# Patient Record
Sex: MALE | HISPANIC OR LATINO | Employment: UNEMPLOYED | URBAN - METROPOLITAN AREA
[De-identification: names, ages, dates, MRNs, and addresses within clinical notes are randomized per-mention and may not be internally consistent; named-entity substitution may affect disease eponyms.]

---

## 2017-04-08 ENCOUNTER — HOSPITAL ENCOUNTER (EMERGENCY)
Facility: HOSPITAL | Age: 62
Discharge: HOME | End: 2017-04-08
Attending: EMERGENCY MEDICINE | Admitting: EMERGENCY MEDICINE

## 2017-04-08 ENCOUNTER — APPOINTMENT (OUTPATIENT)
Dept: CT IMAGING | Facility: HOSPITAL | Age: 62
End: 2017-04-08

## 2017-04-08 VITALS
DIASTOLIC BLOOD PRESSURE: 96 MMHG | OXYGEN SATURATION: 98 % | BODY MASS INDEX: 25.18 KG/M2 | SYSTOLIC BLOOD PRESSURE: 164 MMHG | RESPIRATION RATE: 17 BRPM | HEART RATE: 61 BPM | HEIGHT: 73 IN | TEMPERATURE: 97.4 F | WEIGHT: 190 LBS

## 2017-04-08 DIAGNOSIS — R51.9 ACUTE NONINTRACTABLE HEADACHE, UNSPECIFIED HEADACHE TYPE: Primary | ICD-10-CM

## 2017-04-08 PROCEDURE — 96374 THER/PROPH/DIAG INJ IV PUSH: CPT

## 2017-04-08 PROCEDURE — 99284 EMERGENCY DEPT VISIT MOD MDM: CPT

## 2017-04-08 PROCEDURE — 25010000002 PROMETHAZINE PER 50 MG: Performed by: EMERGENCY MEDICINE

## 2017-04-08 PROCEDURE — 70450 CT HEAD/BRAIN W/O DYE: CPT

## 2017-04-08 PROCEDURE — 25010000002 HYDROMORPHONE PER 4 MG: Performed by: EMERGENCY MEDICINE

## 2017-04-08 PROCEDURE — 96375 TX/PRO/DX INJ NEW DRUG ADDON: CPT

## 2017-04-08 RX ORDER — PROMETHAZINE HYDROCHLORIDE 25 MG/ML
25 INJECTION, SOLUTION INTRAMUSCULAR; INTRAVENOUS ONCE
Status: DISCONTINUED | OUTPATIENT
Start: 2017-04-08 | End: 2017-04-08

## 2017-04-08 RX ORDER — PROMETHAZINE HYDROCHLORIDE 25 MG/ML
25 INJECTION, SOLUTION INTRAMUSCULAR; INTRAVENOUS ONCE
Status: COMPLETED | OUTPATIENT
Start: 2017-04-08 | End: 2017-04-08

## 2017-04-08 RX ORDER — CLONIDINE HYDROCHLORIDE 0.1 MG/1
0.1 TABLET ORAL ONCE
Status: COMPLETED | OUTPATIENT
Start: 2017-04-08 | End: 2017-04-08

## 2017-04-08 RX ADMIN — CLONIDINE HYDROCHLORIDE 0.1 MG: 0.1 TABLET ORAL at 07:47

## 2017-04-08 RX ADMIN — HYDROMORPHONE HYDROCHLORIDE 0.5 MG: 1 INJECTION, SOLUTION INTRAMUSCULAR; INTRAVENOUS; SUBCUTANEOUS at 06:55

## 2017-04-08 RX ADMIN — PROMETHAZINE HYDROCHLORIDE 25 MG: 25 INJECTION INTRAMUSCULAR; INTRAVENOUS at 07:06

## 2017-04-08 NOTE — ED NOTES
"Pt's wife states pt woke up tonight with headache tonight.  Pt denies weakness, denies aphasia, states \"I only have a headache\".     Pebbles Hardy RN  04/08/17 0568    "

## 2017-04-08 NOTE — ED NOTES
Pt. Says at approx 4:45 this am he awoke with a Headache. Pt. Took one 81 mg aspirin w/ no help.     Dc White RN  04/08/17 0580

## 2017-04-08 NOTE — ED PROVIDER NOTES
EMERGENCY DEPARTMENT ENCOUNTER    CHIEF COMPLAINT  Chief Complaint: Headache  History given by: patient  History limited by: n/a  Room Number: 14/14  PMD: Darrius Solis DO      HPI:  Pt is a 62 y.o. male who presents complaining of a frontal headache that was present upon waking. Pt describes the pain as sharp. Pt denies hx of headaches, weakness, visual changes, or any other sx.     Duration:  Prior to arrial  Onset: unknown, present when pt woke up  Timing: constant   Location: frontal  Radiation: none  Quality: sharp  Intensity/Severity: moderate  Progression: unchanged  Associated Symptoms: none  Aggravating Factors: none  Alleviating Factors: none  Previous Episodes: none  Treatment before arrival: none    PAST MEDICAL HISTORY  Active Ambulatory Problems     Diagnosis Date Noted   • No Active Ambulatory Problems     Resolved Ambulatory Problems     Diagnosis Date Noted   • No Resolved Ambulatory Problems     Past Medical History:   Diagnosis Date   • CAD (coronary artery disease)    • Hypercholesteremia    • Hypertension    • Past myocardial infarction        PAST SURGICAL HISTORY  Past Surgical History:   Procedure Laterality Date   • CORONARY STENT PLACEMENT      drug eluting       FAMILY HISTORY  History reviewed. No pertinent family history.    SOCIAL HISTORY  Social History     Social History   • Marital status:      Spouse name: N/A   • Number of children: N/A   • Years of education: N/A     Occupational History   • Not on file.     Social History Main Topics   • Smoking status: Never Smoker   • Smokeless tobacco: Not on file   • Alcohol use Yes   • Drug use: No   • Sexual activity: Not on file     Other Topics Concern   • Not on file     Social History Narrative       ALLERGIES  Review of patient's allergies indicates no known allergies.    REVIEW OF SYSTEMS  Review of Systems   Constitutional: Negative for chills and fever.   HENT: Negative for congestion and sore throat.    Eyes: Negative.     Respiratory: Negative for cough and shortness of breath.    Cardiovascular: Negative for chest pain and leg swelling.   Gastrointestinal: Negative for abdominal pain, diarrhea and vomiting.   Genitourinary: Negative for difficulty urinating and dysuria.   Musculoskeletal: Negative for back pain and neck pain.   Skin: Negative for rash and wound.   Allergic/Immunologic: Negative.    Neurological: Positive for headaches. Negative for dizziness, weakness and numbness.   Psychiatric/Behavioral: Negative.    All other systems reviewed and are negative.      PHYSICAL EXAM  ED Triage Vitals   Temp Heart Rate Resp BP SpO2   04/08/17 0519 04/08/17 0519 04/08/17 0519 04/08/17 0524 04/08/17 0519   96 °F (35.6 °C) 70 16 181/119 100 %      Temp src Heart Rate Source Patient Position BP Location FiO2 (%)   04/08/17 0519 04/08/17 0519 -- 04/08/17 0524 --   Tympanic Monitor  Right arm        Physical Exam   Constitutional: He is oriented to person, place, and time and well-developed, well-nourished, and in no distress.   HENT:   Head: Normocephalic and atraumatic.   Eyes: EOM are normal. Pupils are equal, round, and reactive to light.   Neck: Normal range of motion. Neck supple.   Cardiovascular: Normal rate, regular rhythm and normal heart sounds.    Pulmonary/Chest: Effort normal and breath sounds normal. No respiratory distress.   Abdominal: Soft. There is no tenderness. There is no rebound and no guarding.   Musculoskeletal: Normal range of motion. He exhibits no edema.   Neurological: He is alert and oriented to person, place, and time. He has normal sensation and normal strength.   Skin: Skin is warm and dry.   Psychiatric: Mood and affect normal.   Nursing note and vitals reviewed.      LAB RESULTS  Lab Results (last 24 hours)     ** No results found for the last 24 hours. **          I ordered the above labs and reviewed the results    RADIOLOGY  CT Head Without Contrast   Preliminary Result   No evidence of acute  infarction or hemorrhage. Small remote   periventricular infarct involving the left frontal lobe. Arachnoid cyst   versus hygroma to the right of the brice and medulla. This measures   approximately 17-mm in transverse dimension. Comparison with previous   studies is suggested. If no prior studies available for comparison,   further evaluation could be performed with MRI examination of the brain.   The above information was called to and discussed with Dr. Borjas.          CT head shows old small frontal stroke. Left of the brice, there is an old hydroma or arachnoid cyst.     I ordered the above noted radiological studies. Interpreted by radiologist. Discussed with radiologist (Dr Yancey). Reviewed by me in PACS.       PROCEDURES  Procedures      PROGRESS AND CONSULTS  ED Course     05:48  CT head ordered for further evaluation.     06:45  Dilaudid and Phenergan ordered to treat headache.     06:47  BP- (!) 180/113 HR- 63 Temp- 96 °F (35.6 °C) (Tympanic) O2 sat- 96%  Rechecked the patient who is in NAD and is resting comfortably. Advised pt that the CT head shows NAD. Plan to treat the headache and monitor the affect on the BP. Pt understands and agrees with the plan, all questions answered.    0740  BP is now 160/103. Pt will be discharged. Pt understands and agrees with the plan, all questions answered.    MEDICAL DECISION MAKING  Results were reviewed/discussed with the patient and they were also made aware of online access. Pt also made aware that some labs, such as cultures, will not be resulted during ER visit and follow up with PMD is necessary.     MDM  Number of Diagnoses or Management Options     Amount and/or Complexity of Data Reviewed  Tests in the radiology section of CPT®: ordered and reviewed (CT head shows old small frontal stroke. Left of the brice, there is an old hydroma or arachnoid cyst. )  Discussion of test results with the performing providers: yes (Dr Yancey)    Patient Progress  Patient  progress: stable         DIAGNOSIS  Final diagnoses:   Acute nonintractable headache, unspecified headache type       DISPOSITION  DISCHARGE    Patient discharged in stable condition.    Reviewed implications of results, diagnosis, meds, responsibility to follow up, warning signs and symptoms of possible worsening, potential complications and reasons to return to ER.    Patient/Family voiced understanding of above instructions.    Discussed plan for discharge, as there is no emergent indication for admission.  Pt/family is agreeable and understands need for follow up and repeat testing.  Pt is aware that discharge does not mean that nothing is wrong but it indicates no emergency is present that requires admission and they must continue care with follow-up as given below or physician of their choice.     FOLLOW-UP  Darrius Solis DO  [address] [id number] [phone]    Schedule an appointment as soon as possible for a visit           Medication List      Notice     No changes were made to your prescriptions during this visit.            Latest Documented Vital Signs:  As of 7:41 AM  BP- (!) 171/107 HR- 55 Temp- 96 °F (35.6 °C) (Tympanic) O2 sat- 96%    --  Documentation assistance provided by jose miguel Fam for Dr Borjas.  Information recorded by the jose miguel was done at my direction and has been verified and validated by me.        Rachel Fam  04/08/17 0726       Mynor Borjas MD  04/08/17 0742

## 2018-04-26 ENCOUNTER — OFFICE VISIT (OUTPATIENT)
Dept: CARDIOLOGY | Facility: CLINIC | Age: 63
End: 2018-04-26

## 2018-04-26 VITALS
SYSTOLIC BLOOD PRESSURE: 140 MMHG | BODY MASS INDEX: 27.58 KG/M2 | WEIGHT: 197 LBS | HEIGHT: 71 IN | HEART RATE: 61 BPM | DIASTOLIC BLOOD PRESSURE: 80 MMHG

## 2018-04-26 DIAGNOSIS — I25.10 CORONARY ARTERY DISEASE INVOLVING NATIVE CORONARY ARTERY OF NATIVE HEART WITHOUT ANGINA PECTORIS: Primary | ICD-10-CM

## 2018-04-26 DIAGNOSIS — E78.2 MIXED HYPERLIPIDEMIA: ICD-10-CM

## 2018-04-26 DIAGNOSIS — I10 ESSENTIAL HYPERTENSION: ICD-10-CM

## 2018-04-26 PROCEDURE — 93000 ELECTROCARDIOGRAM COMPLETE: CPT | Performed by: INTERNAL MEDICINE

## 2018-04-26 PROCEDURE — 99214 OFFICE O/P EST MOD 30 MIN: CPT | Performed by: INTERNAL MEDICINE

## 2018-04-26 RX ORDER — SILDENAFIL 100 MG/1
50-100 TABLET, FILM COATED ORAL AS NEEDED
COMMUNITY
Start: 2018-03-23 | End: 2019-03-23

## 2018-04-26 RX ORDER — MELATONIN
1000 DAILY
COMMUNITY
End: 2019-04-26 | Stop reason: DRUGHIGH

## 2018-04-26 NOTE — PROGRESS NOTES
Date of Office Visit: 18  Encounter Provider: Richar Mckeon MD  Place of Service: [facility] CARDIOLOGY  Patient Name: Shira Vargas  :1955  Referral Provider:Richar Mckeon MD      Chief Complaint   Patient presents with   • Coronary Artery Disease     History of Present Illness  He is a 63-year-old gentleman who presented with acute onset of chest pain 10/25/2012.   He went to the [facility] emergency room and was found to have an acute anterior   myocardial infarction. He was taken emergently to the cath lab. Left ventricular ejection   fraction was 45%. He had total occlusion of his mid LAD. First diagonal had insignificant   disease as did the circumflex. Right coronary artery had insignificant disease. Borderline   disease of the posterolateral vessel. He had angioplasty and bare-metal stent placement   of the mid left anterior descending. He now comes in for followup. No shortness of breath, othopnea or PND. No palpitations, near syncope or syncope. No stroke type symptoms like paralysis, paresthesia, abrupt vision loss and dysarthria. No bleeding like blood in the stool or dark stools.   He does try to walk 2-3 miles every day.  He may get some chest discomfort when he first starts out rest and then can carry on without problems.  This is been unchanged for years though.  He didn't spend 3 months in [place] which he tries to do every year and is able to get a little more activity.      Hypertension   Pertinent negatives include no blurred vision, headaches or malaise/fatigue.         Past Medical History:   Diagnosis Date   • CAD (coronary artery disease)    • Hypercholesteremia    • Hypertension    • Past myocardial infarction          Past Surgical History:   Procedure Laterality Date   • CORONARY STENT PLACEMENT      drug eluting         Current Outpatient Prescriptions on File Prior to Visit   Medication Sig Dispense Refill   • aspirin 81 MG tablet  Take  by mouth Daily.     • lisinopril (PRINIVIL,ZESTRIL) 10 MG tablet Take  by mouth.     • metFORMIN (GLUCOPHAGE) 500 MG tablet Take  by mouth 2 (Two) Times a Day.     • metoprolol tartrate (LOPRESSOR) 25 MG tablet 25 mg. 1/2 tablet bid     • simvastatin (ZOCOR) 40 MG tablet Take 40 mg by mouth Every Night.       No current facility-administered medications on file prior to visit.          Social History     Social History   • Marital status:      Spouse name: N/A   • Number of children: N/A   • Years of education: N/A     Occupational History   • Not on file.     Social History Main Topics   • Smoking status: Never Smoker   • Smokeless tobacco: Not on file   • Alcohol use Yes   • Drug use: No   • Sexual activity: Not on file     Other Topics Concern   • Not on file     Social History Narrative   • No narrative on file       History reviewed. No pertinent family history.      Review of Systems   Constitution: Negative for decreased appetite, diaphoresis, fever, weakness, malaise/fatigue, weight gain and weight loss.   HENT: Negative for congestion, hearing loss, nosebleeds and tinnitus.    Eyes: Negative for blurred vision, double vision, vision loss in left eye, vision loss in right eye and visual disturbance.   Cardiovascular:        As noted in HPI   Respiratory:        As noted HPI   Endocrine: Negative for cold intolerance and heat intolerance.   Hematologic/Lymphatic: Negative for bleeding problem. Does not bruise/bleed easily.   Skin: Negative for color change, flushing, itching and rash.   Musculoskeletal: Negative for arthritis, back pain, joint pain, joint swelling, muscle weakness and myalgias.   Gastrointestinal: Negative for bloating, abdominal pain, constipation, diarrhea, dysphagia, heartburn, hematemesis, hematochezia, melena, nausea and vomiting.   Genitourinary: Negative for bladder incontinence, dysuria, frequency, nocturia and urgency.   Neurological: Negative for dizziness, focal  weakness, headaches, light-headedness, loss of balance, numbness, paresthesias and vertigo.   Psychiatric/Behavioral: Negative for depression, memory loss and substance abuse.       Procedures      ECG 12 Lead  Date/Time: 4/26/2018 1:46 PM  Performed by: GLORY DAVE  Authorized by: GLORY DAVE   Comparison: compared with previous ECG   Similar to previous ECG  Rhythm: sinus rhythm  Rate: normal  QRS axis: normal                  Objective:    /82 (BP Location: Right arm)        Physical Exam  Physical Exam   Constitutional: He is oriented to person, place, and time. He appears well-developed and well-nourished. No distress.   HENT:   Head: Normocephalic.   Eyes: Conjunctivae are normal. Pupils are equal, round, and reactive to light. No scleral icterus.   Neck: Normal carotid pulses, no hepatojugular reflux and no JVD present. Carotid bruit is not present. No tracheal deviation, no edema and no erythema present. No thyromegaly present.   Cardiovascular: Normal rate, regular rhythm, S1 normal, S2 normal, normal heart sounds and intact distal pulses.   No extrasystoles are present. PMI is not displaced.  Exam reveals no gallop, no distant heart sounds and no friction rub.    No murmur heard.  Pulses:       Carotid pulses are 2+ on the right side, and 2+ on the left side.       Radial pulses are 1+ on the right side, and 2+ on the left side.        Femoral pulses are 2+ on the right side, and 2+ on the left side.       Dorsalis pedis pulses are 2+ on the right side, and 2+ on the left side.        Posterior tibial pulses are 2+ on the right side, and 2+ on the left side.   Pulmonary/Chest: Effort normal and breath sounds normal. No respiratory distress. He has no decreased breath sounds. He has no wheezes. He has no rhonchi. He has no rales. He exhibits no tenderness.   Abdominal: Soft. Bowel sounds are normal. He exhibits no distension and no mass. There is no hepatosplenomegaly. There is no  tenderness. There is no rebound and no guarding.   Musculoskeletal: He exhibits no edema, tenderness or deformity.   Neurological: He is alert and oriented to person, place, and time.   Skin: Skin is warm and dry. No rash noted. He is not diaphoretic. No cyanosis or erythema. No pallor. Nails show no clubbing.   Psychiatric: He has a normal mood and affect. His speech is normal and behavior is normal. Judgment and thought content normal.           Assessment:   1. This is a 61-year-old gentleman status post anterior myocardial infarction in October 2012 with primary angioplasty and bare-metal stent placement of the left anterior  descending, mild left ventricular systolic dysfunction with left ventricular ejection fraction or 45%. He had a follow up echocardiogram in 8/13 and his LV systolic   function had returned to normal. Normal perfusion stress test December 2016.    He will see us skin in follow-up in a year.     2. Hypertension. Blood pressure adequately controlled.  3. Hyperlipidemia, on simvastatin. Target LDL of 70 or less.  last LDL March 2018 was 60.         Plan:

## 2019-04-26 ENCOUNTER — OFFICE VISIT (OUTPATIENT)
Dept: CARDIOLOGY | Facility: CLINIC | Age: 64
End: 2019-04-26

## 2019-04-26 VITALS
SYSTOLIC BLOOD PRESSURE: 152 MMHG | WEIGHT: 194 LBS | HEART RATE: 64 BPM | HEIGHT: 72 IN | BODY MASS INDEX: 26.28 KG/M2 | DIASTOLIC BLOOD PRESSURE: 86 MMHG

## 2019-04-26 DIAGNOSIS — I25.10 CORONARY ARTERY DISEASE INVOLVING NATIVE CORONARY ARTERY OF NATIVE HEART WITHOUT ANGINA PECTORIS: Primary | ICD-10-CM

## 2019-04-26 DIAGNOSIS — E78.2 MIXED HYPERLIPIDEMIA: ICD-10-CM

## 2019-04-26 DIAGNOSIS — I10 ESSENTIAL HYPERTENSION: ICD-10-CM

## 2019-04-26 PROCEDURE — 93000 ELECTROCARDIOGRAM COMPLETE: CPT | Performed by: NURSE PRACTITIONER

## 2019-04-26 PROCEDURE — 99214 OFFICE O/P EST MOD 30 MIN: CPT | Performed by: NURSE PRACTITIONER

## 2019-04-26 NOTE — PROGRESS NOTES
Date of Office Visit: 2019  Encounter Provider: DENNISE Josue  Place of Service: [facility] CARDIOLOGY  Patient Name: Shira Vargas  :1955    Chief Complaint   Patient presents with   • Coronary Artery Disease   • Follow-up   :     HPI: Shira Vargas is a 64 y.o. male is a patient of Dr. Mckeon.  I will be seeing him for the first time today and have reviewed his record.      His past medical history is significant of coronary artery disease status post angioplasty and stent placement, myocardial infarction, hypertension, and hyperlipidemia.    In 2012 he presented with acute onset of chest pain and was found to have an acute anterior myocardial infarction.  He was taken emergently to the Cath Lab was found to have decreased left ventricular systolic function with an ejection fraction of 45%.  He had total occlusion of the mid LAD.  First diagonal had insignificant disease as did the circumflex.  The right coronary artery had insignificant disease.  He had borderline disease of the posterior lateral vessel and had angioplasty bare-metal stent to the mid LAD.    Patient presents today for annual reassessment.  He has been out of the habit of walking 2 to 3 miles every day because in September he had a work injury where a beam fell onto him.  He he injured his back.  MRI showed L4-5 herniated compression with compression on the L5 nerve root.  He has had 3 injections which has helped his pain.  He was walking with a cane but for the last week has done better.  He is interested in physical therapy.  He is taking Aleve once or twice a week to help the pain.  He denies chest pain tightness pressure, palpitation, shortness of breath, edema, lightheadedness, near-syncope, syncope.  He has been off work since September.  He has increased back pain after walking 1/4 mile.      No Known Allergies    Past Medical History:   Diagnosis Date   • CAD (coronary artery disease)    •  "Hypercholesteremia    • Hypertension    • Past myocardial infarction        Past Surgical History:   Procedure Laterality Date   • CORONARY STENT PLACEMENT      drug eluting         Family and social history reviewed.     Review of Systems   Musculoskeletal: Positive for joint pain.     All other systems were reviewed and are negative          Objective:     Vitals:    04/26/19 1405 04/26/19 1439   BP: 178/90 152/86   BP Location: Left arm    Patient Position: Sitting    Pulse: 64    Weight: 88 kg (194 lb)    Height: 182.9 cm (72\")      Body mass index is 26.31 kg/m².    PHYSICAL EXAM:  Physical Exam   Constitutional: He is oriented to person, place, and time. He appears well-developed and well-nourished. No distress.   HENT:   Head: Normocephalic.   Eyes: Conjunctivae are normal.   Neck: Normal range of motion. No JVD present.   Cardiovascular: Normal rate, regular rhythm, normal heart sounds and intact distal pulses.   No murmur heard.  Pulses:       Carotid pulses are 2+ on the right side, and 2+ on the left side.       Radial pulses are 2+ on the right side, and 2+ on the left side.        Posterior tibial pulses are 2+ on the right side, and 2+ on the left side.   Pulmonary/Chest: Effort normal and breath sounds normal. No respiratory distress. He has no wheezes. He has no rhonchi. He has no rales. He exhibits no tenderness.   Abdominal: Soft. Bowel sounds are normal. He exhibits no distension.   Musculoskeletal: Normal range of motion. He exhibits no edema.   Neurological: He is alert and oriented to person, place, and time.   Skin: Skin is warm, dry and intact. No rash noted. He is not diaphoretic. No cyanosis.   Psychiatric: He has a normal mood and affect. His behavior is normal. Judgment and thought content normal.         ECG 12 Lead  Date/Time: 4/26/2019 3:07 PM  Performed by: Nyla Bruner APRN  Authorized by: Nyla Bruner APRN   Comparison: compared with previous ECG from 4/26/2018  Similar to " previous ECG  Rhythm: sinus rhythm  Rate: normal  T inversion: III  T flattening: aVF  QRS axis: normal    Clinical impression: abnormal EKG  Comments: No change from prior            Current Outpatient Medications   Medication Sig Dispense Refill   • aspirin 81 MG tablet Take  by mouth Daily.     • Cholecalciferol (EQL VITAMIN D3 PO) Take 50,000 Units by mouth 1 (One) Time Per Week.     • lisinopril (PRINIVIL,ZESTRIL) 10 MG tablet Take  by mouth.     • metFORMIN (GLUCOPHAGE) 500 MG tablet Take  by mouth 2 (Two) Times a Day.     • metoprolol tartrate (LOPRESSOR) 25 MG tablet 25 mg. 1/2 tablet bid     • simvastatin (ZOCOR) 40 MG tablet Take 40 mg by mouth Every Night.       No current facility-administered medications for this visit.      Assessment:       Diagnosis Plan   1. Coronary artery disease involving native coronary artery of native heart without angina pectoris     2. Essential hypertension     3. Mixed hyperlipidemia          Orders Placed This Encounter   Procedures   • ECG 12 Lead     This order was created via procedure documentation         Plan:         1.  Coronary artery disease with history of anterior myocardial infarction in 10/2012 with angioplasty bare-metal stent to the LAD, mild left ventricular systolic dysfunction with an EF of 45%.  Follow-up echo August 2013 LVEF returned to normal.  Negative perfusion stress test December 2016.  No angina no ECG changes continue the same  Coronary Artery Disease  Assessment  • The patient has no angina    Subjective - Objective  • There is a history of past MI  • There has been a previous stent procedure using BMS  • Current antiplatelet therapy includes aspirin 81 mg        2.Hypertension elevated today but was some better on recheck.  Other values in the system have been within normal range continue to follow clinically  3.Hyperlipidemia LDL 60 in 03/2018   4.  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      Thank you,  DENNISE Josue      **Claudio Disclaimer:**  Much of this encounter note is an electronic transcription/translation of spoken language to printed text. The electronic translation of spoken language may permit erroneous, or at times, nonsensical words or phrases to be inadvertently transcribed. Although I have reviewed the note for such errors, some may still exist.

## 2020-05-13 ENCOUNTER — TELEPHONE (OUTPATIENT)
Dept: CARDIOLOGY | Facility: CLINIC | Age: 65
End: 2020-05-13

## 2020-05-21 ENCOUNTER — TELEMEDICINE (OUTPATIENT)
Dept: CARDIOLOGY | Facility: CLINIC | Age: 65
End: 2020-05-21

## 2020-05-21 VITALS — BODY MASS INDEX: 26.41 KG/M2 | WEIGHT: 195 LBS | HEIGHT: 72 IN

## 2020-05-21 DIAGNOSIS — I25.110 CORONARY ARTERY DISEASE INVOLVING NATIVE CORONARY ARTERY OF NATIVE HEART WITH UNSTABLE ANGINA PECTORIS (HCC): Primary | ICD-10-CM

## 2020-05-21 DIAGNOSIS — I10 ESSENTIAL HYPERTENSION: ICD-10-CM

## 2020-05-21 DIAGNOSIS — E78.2 MIXED HYPERLIPIDEMIA: ICD-10-CM

## 2020-05-21 DIAGNOSIS — E11.59 TYPE 2 DIABETES MELLITUS WITH OTHER CIRCULATORY COMPLICATION, WITHOUT LONG-TERM CURRENT USE OF INSULIN (HCC): ICD-10-CM

## 2020-05-21 PROCEDURE — 99214 OFFICE O/P EST MOD 30 MIN: CPT | Performed by: INTERNAL MEDICINE

## 2020-05-21 RX ORDER — SILDENAFIL 100 MG/1
100 TABLET, FILM COATED ORAL AS NEEDED
COMMUNITY
Start: 2020-01-03

## 2020-05-21 NOTE — PROGRESS NOTES
Date of Office Visit: 20  Encounter Provider: Richar Mckeon MD  Place of Service: [facility] CARDIOLOGY  Patient Name: Shira Vargas  :1955  Referral Provider:No ref. provider found      No chief complaint on file.    History of Present Illness  This patient has consented to a telehealth visit via phone. The visit was scheduled as a follow up visit to comply with patient safety concerns in accordance with CDC recommendations.  All vitals recorded within this visit are reported by the patient.  I spent  25 minutes in total including but not limited to the 10 minutes spent in direct conversation with this patient.     Mr Vargas  is a 65year-old gentleman who presented with acute onset of chest pain 10/25/2012.   He went to the [facility] emergency room and was found to have an acute anterior   myocardial infarction. He was taken emergently to the cath lab. Left ventricular ejection   fraction was 45%. He had total occlusion of his mid LAD. First diagonal had insignificant   disease as did the circumflex. Right coronary artery had insignificant disease. Borderline   disease of the posterolateral vessel. He had angioplasty and bare-metal stent placement   of the mid left anterior descending.  Unfortunately about 6 months ago he started getting this chest pressure with shortness of breath with exertional activity.  He has gotten progressively worse over that time.  Probably occurring with less activity.  He can stop and rest for 10 to 15 minutes and then get up and go.  He had no rest symptoms.  No dizziness, lightheadedness, near-syncope or syncope.  No palpitations.  No orthopnea or PND.  Addition his hemoglobin A1c been elevated at 8.7 and his metformin was increased.  And checking his blood pressure at home.  He denies any blood in his stool or black tarry stools.  Results of recent blood work including renal function that was normal and hemoglobin hematocrit that were  normal.          Past Medical History:   Diagnosis Date   • CAD (coronary artery disease)    • Hypercholesteremia    • Hypertension    • Past myocardial infarction          Past Surgical History:   Procedure Laterality Date   • CORONARY STENT PLACEMENT      drug eluting         Current Outpatient Medications on File Prior to Visit   Medication Sig Dispense Refill   • aspirin 81 MG tablet Take  by mouth Daily.     • Cholecalciferol (EQL VITAMIN D3 PO) Take 50,000 Units by mouth 1 (One) Time Per Week.     • lisinopril (PRINIVIL,ZESTRIL) 10 MG tablet Take  by mouth.     • metFORMIN (GLUCOPHAGE) 500 MG tablet Take  by mouth 2 (Two) Times a Day.     • metoprolol tartrate (LOPRESSOR) 25 MG tablet 25 mg. 1/2 tablet bid     • simvastatin (ZOCOR) 40 MG tablet Take 40 mg by mouth Every Night.       No current facility-administered medications on file prior to visit.          Social History     Socioeconomic History   • Marital status:      Spouse name: Not on file   • Number of children: Not on file   • Years of education: Not on file   • Highest education level: Not on file   Tobacco Use   • Smoking status: Never Smoker   • Tobacco comment: caffeine use - 2 cups of coffee daily   Substance and Sexual Activity   • Alcohol use: Yes   • Drug use: No       No family history on file.      Review of Systems   Constitution: Negative for decreased appetite, diaphoresis, fever, malaise/fatigue, weight gain and weight loss.   HENT: Negative for congestion, hearing loss, nosebleeds and tinnitus.    Eyes: Negative for blurred vision, double vision, vision loss in left eye, vision loss in right eye and visual disturbance.   Cardiovascular:        As noted in HPI   Respiratory:        As noted HPI   Endocrine: Negative for cold intolerance and heat intolerance.   Hematologic/Lymphatic: Negative for bleeding problem. Does not bruise/bleed easily.   Skin: Negative for color change, flushing, itching and rash.   Musculoskeletal:  Negative for arthritis, back pain, joint pain, joint swelling, muscle weakness and myalgias.   Gastrointestinal: Negative for bloating, abdominal pain, constipation, diarrhea, dysphagia, heartburn, hematemesis, hematochezia, melena, nausea and vomiting.   Genitourinary: Negative for bladder incontinence, dysuria, frequency, nocturia and urgency.   Neurological: Negative for dizziness, focal weakness, headaches, light-headedness, loss of balance, numbness, paresthesias, vertigo and weakness.   Psychiatric/Behavioral: Negative for depression, memory loss and substance abuse.       Procedures    Procedures        Objective:    There were no vitals taken for this visit.       Physical Exam  Physical Exam  Phone call      Assessment:   1. This is a 65-year-old gentleman status post anterior myocardial infarction in October 2012 with primary angioplasty and bare-metal stent placement of the left anterior  descending, mild left ventricular systolic dysfunction with left ventricular ejection fraction or 45%. He had a follow up echocardiogram in 8/13 and his LV systolic   function had returned to normal. Normal perfusion stress test December 2016.  Now with unstable angina.  We will proceed with cardiac catheterization he seemed agreeable understands the risks.  In the interim will increase his metoprolol to 25 mg twice a day.   2. Hypertension.  Unsure what his blood pressures been running.  3. Hyperlipidemia, on simvastatin. Target LDL of 70 or less.  last LDL April 2020 could not calculate LDL because tryglycerides too high.  Certainly need to work on this more and had been managed by his PCP will need ultimately switch him to atorvastatin or rosuvastatin.  4.  Diabetes mellitus hemoglobin A1c 8.7 on higher dose of metformin.  He may benefit from endocrine referral.  5 3 elevated PSA at 4 reportedly his PCP was to set up with urology.     Plan:

## 2020-06-08 ENCOUNTER — TRANSCRIBE ORDERS (OUTPATIENT)
Dept: SLEEP MEDICINE | Facility: HOSPITAL | Age: 65
End: 2020-06-08

## 2020-06-08 ENCOUNTER — TRANSCRIBE ORDERS (OUTPATIENT)
Dept: CARDIOLOGY | Facility: CLINIC | Age: 65
End: 2020-06-08

## 2020-06-08 DIAGNOSIS — Z01.810 PRE-OPERATIVE CARDIOVASCULAR EXAMINATION: Primary | ICD-10-CM

## 2020-06-08 DIAGNOSIS — Z13.6 SCREENING FOR ISCHEMIC HEART DISEASE: ICD-10-CM

## 2020-06-08 DIAGNOSIS — Z01.818 OTHER SPECIFIED PRE-OPERATIVE EXAMINATION: Primary | ICD-10-CM

## 2020-06-08 PROBLEM — I25.110 CORONARY ARTERY DISEASE INVOLVING NATIVE CORONARY ARTERY OF NATIVE HEART WITH UNSTABLE ANGINA PECTORIS (HCC): Status: ACTIVE | Noted: 2020-06-08

## 2020-06-09 ENCOUNTER — LAB (OUTPATIENT)
Dept: LAB | Facility: HOSPITAL | Age: 65
End: 2020-06-09

## 2020-06-09 DIAGNOSIS — Z01.818 OTHER SPECIFIED PRE-OPERATIVE EXAMINATION: ICD-10-CM

## 2020-06-09 PROCEDURE — U0004 COV-19 TEST NON-CDC HGH THRU: HCPCS

## 2020-06-10 LAB
REF LAB TEST METHOD: NORMAL
SARS-COV-2 RNA RESP QL NAA+PROBE: NOT DETECTED

## 2020-06-11 ENCOUNTER — HOSPITAL ENCOUNTER (OUTPATIENT)
Facility: HOSPITAL | Age: 65
Setting detail: HOSPITAL OUTPATIENT SURGERY
Discharge: HOME | End: 2020-06-11
Attending: INTERNAL MEDICINE | Admitting: INTERNAL MEDICINE

## 2020-06-11 VITALS
HEART RATE: 53 BPM | WEIGHT: 194 LBS | HEIGHT: 72 IN | SYSTOLIC BLOOD PRESSURE: 133 MMHG | BODY MASS INDEX: 26.28 KG/M2 | TEMPERATURE: 97.2 F | RESPIRATION RATE: 16 BRPM | OXYGEN SATURATION: 97 % | DIASTOLIC BLOOD PRESSURE: 87 MMHG

## 2020-06-11 DIAGNOSIS — I25.110 CORONARY ARTERY DISEASE INVOLVING NATIVE CORONARY ARTERY OF NATIVE HEART WITH UNSTABLE ANGINA PECTORIS (HCC): ICD-10-CM

## 2020-06-11 LAB
ANION GAP SERPL CALCULATED.3IONS-SCNC: 9.7 MMOL/L (ref 5–15)
BASOPHILS # BLD AUTO: 0.01 10*3/MM3 (ref 0–0.2)
BASOPHILS NFR BLD AUTO: 0.2 % (ref 0–1.5)
BUN BLD-MCNC: 7 MG/DL (ref 8–23)
BUN/CREAT SERPL: 9.7 (ref 7–25)
CALCIUM SPEC-SCNC: 8.8 MG/DL (ref 8.6–10.5)
CHLORIDE SERPL-SCNC: 104 MMOL/L (ref 98–107)
CO2 SERPL-SCNC: 26.3 MMOL/L (ref 22–29)
CREAT BLD-MCNC: 0.72 MG/DL (ref 0.76–1.27)
DEPRECATED RDW RBC AUTO: 40.6 FL (ref 37–54)
EOSINOPHIL # BLD AUTO: 0.06 10*3/MM3 (ref 0–0.4)
EOSINOPHIL NFR BLD AUTO: 1.2 % (ref 0.3–6.2)
ERYTHROCYTE [DISTWIDTH] IN BLOOD BY AUTOMATED COUNT: 12.4 % (ref 12.3–15.4)
GFR SERPL CREATININE-BSD FRML MDRD: 110 ML/MIN/1.73
GFR SERPL CREATININE-BSD FRML MDRD: 133 ML/MIN/1.73
GLUCOSE BLD-MCNC: 113 MG/DL (ref 65–99)
HCT VFR BLD AUTO: 40 % (ref 37.5–51)
HGB BLD-MCNC: 13.1 G/DL (ref 13–17.7)
IMM GRANULOCYTES # BLD AUTO: 0.02 10*3/MM3 (ref 0–0.05)
IMM GRANULOCYTES NFR BLD AUTO: 0.4 % (ref 0–0.5)
LYMPHOCYTES # BLD AUTO: 1.79 10*3/MM3 (ref 0.7–3.1)
LYMPHOCYTES NFR BLD AUTO: 34.6 % (ref 19.6–45.3)
MCH RBC QN AUTO: 29.2 PG (ref 26.6–33)
MCHC RBC AUTO-ENTMCNC: 32.8 G/DL (ref 31.5–35.7)
MCV RBC AUTO: 89.3 FL (ref 79–97)
MONOCYTES # BLD AUTO: 0.39 10*3/MM3 (ref 0.1–0.9)
MONOCYTES NFR BLD AUTO: 7.5 % (ref 5–12)
NEUTROPHILS # BLD AUTO: 2.9 10*3/MM3 (ref 1.7–7)
NEUTROPHILS NFR BLD AUTO: 56.1 % (ref 42.7–76)
NRBC BLD AUTO-RTO: 0 /100 WBC (ref 0–0.2)
PLATELET # BLD AUTO: 162 10*3/MM3 (ref 140–450)
PMV BLD AUTO: 12.1 FL (ref 6–12)
POTASSIUM BLD-SCNC: 4 MMOL/L (ref 3.5–5.2)
RBC # BLD AUTO: 4.48 10*6/MM3 (ref 4.14–5.8)
SODIUM BLD-SCNC: 140 MMOL/L (ref 136–145)
WBC NRBC COR # BLD: 5.17 10*3/MM3 (ref 3.4–10.8)

## 2020-06-11 PROCEDURE — 85025 COMPLETE CBC W/AUTO DIFF WBC: CPT | Performed by: INTERNAL MEDICINE

## 2020-06-11 PROCEDURE — 99152 MOD SED SAME PHYS/QHP 5/>YRS: CPT | Performed by: INTERNAL MEDICINE

## 2020-06-11 PROCEDURE — 93458 L HRT ARTERY/VENTRICLE ANGIO: CPT | Performed by: INTERNAL MEDICINE

## 2020-06-11 PROCEDURE — 92921 PR PRQ TRLUML CORONARY ANGIOPLASTY ADDL BRANCH: CPT | Performed by: INTERNAL MEDICINE

## 2020-06-11 PROCEDURE — 25010000002 HEPARIN (PORCINE) PER 1000 UNITS: Performed by: INTERNAL MEDICINE

## 2020-06-11 PROCEDURE — 0 IOPAMIDOL PER 1 ML: Performed by: INTERNAL MEDICINE

## 2020-06-11 PROCEDURE — C1725 CATH, TRANSLUMIN NON-LASER: HCPCS | Performed by: INTERNAL MEDICINE

## 2020-06-11 PROCEDURE — C1894 INTRO/SHEATH, NON-LASER: HCPCS | Performed by: INTERNAL MEDICINE

## 2020-06-11 PROCEDURE — 92928 PRQ TCAT PLMT NTRAC ST 1 LES: CPT | Performed by: INTERNAL MEDICINE

## 2020-06-11 PROCEDURE — C1874 STENT, COATED/COV W/DEL SYS: HCPCS | Performed by: INTERNAL MEDICINE

## 2020-06-11 PROCEDURE — 85347 COAGULATION TIME ACTIVATED: CPT

## 2020-06-11 PROCEDURE — C1769 GUIDE WIRE: HCPCS | Performed by: INTERNAL MEDICINE

## 2020-06-11 PROCEDURE — C9600 PERC DRUG-EL COR STENT SING: HCPCS | Performed by: INTERNAL MEDICINE

## 2020-06-11 PROCEDURE — 99153 MOD SED SAME PHYS/QHP EA: CPT | Performed by: INTERNAL MEDICINE

## 2020-06-11 PROCEDURE — 80048 BASIC METABOLIC PNL TOTAL CA: CPT | Performed by: INTERNAL MEDICINE

## 2020-06-11 PROCEDURE — 93005 ELECTROCARDIOGRAM TRACING: CPT | Performed by: INTERNAL MEDICINE

## 2020-06-11 PROCEDURE — 25010000002 FENTANYL CITRATE (PF) 100 MCG/2ML SOLUTION: Performed by: INTERNAL MEDICINE

## 2020-06-11 PROCEDURE — 25010000002 MIDAZOLAM PER 1 MG: Performed by: INTERNAL MEDICINE

## 2020-06-11 PROCEDURE — C1887 CATHETER, GUIDING: HCPCS | Performed by: INTERNAL MEDICINE

## 2020-06-11 PROCEDURE — 93010 ELECTROCARDIOGRAM REPORT: CPT | Performed by: INTERNAL MEDICINE

## 2020-06-11 DEVICE — XIENCE SIERRA™ EVEROLIMUS ELUTING CORONARY STENT SYSTEM 3.00 MM X 23 MM / RAPID-EXCHANGE
Type: IMPLANTABLE DEVICE | Status: FUNCTIONAL
Brand: XIENCE SIERRA™

## 2020-06-11 DEVICE — XIENCE SIERRA™ EVEROLIMUS ELUTING CORONARY STENT SYSTEM 3.50 MM X 33 MM / RAPID-EXCHANGE
Type: IMPLANTABLE DEVICE | Status: FUNCTIONAL
Brand: XIENCE SIERRA™

## 2020-06-11 RX ORDER — CLOPIDOGREL BISULFATE 75 MG/1
75 TABLET ORAL DAILY
Qty: 90 TABLET | Refills: 3 | Status: SHIPPED | OUTPATIENT
Start: 2020-06-11 | End: 2022-06-08

## 2020-06-11 RX ORDER — HEPARIN SODIUM 1000 [USP'U]/ML
INJECTION, SOLUTION INTRAVENOUS; SUBCUTANEOUS AS NEEDED
Status: DISCONTINUED | OUTPATIENT
Start: 2020-06-11 | End: 2020-06-11 | Stop reason: HOSPADM

## 2020-06-11 RX ORDER — LIDOCAINE HYDROCHLORIDE 10 MG/ML
0.1 INJECTION, SOLUTION EPIDURAL; INFILTRATION; INTRACAUDAL; PERINEURAL ONCE AS NEEDED
Status: DISCONTINUED | OUTPATIENT
Start: 2020-06-11 | End: 2020-06-11 | Stop reason: HOSPADM

## 2020-06-11 RX ORDER — CLOPIDOGREL BISULFATE 75 MG/1
TABLET ORAL AS NEEDED
Status: DISCONTINUED | OUTPATIENT
Start: 2020-06-11 | End: 2020-06-11 | Stop reason: HOSPADM

## 2020-06-11 RX ORDER — SODIUM CHLORIDE 0.9 % (FLUSH) 0.9 %
3 SYRINGE (ML) INJECTION EVERY 12 HOURS SCHEDULED
Status: CANCELLED | OUTPATIENT
Start: 2020-06-11

## 2020-06-11 RX ORDER — SODIUM CHLORIDE 9 MG/ML
1 INJECTION, SOLUTION INTRAVENOUS CONTINUOUS
Status: ACTIVE | OUTPATIENT
Start: 2020-06-11 | End: 2020-06-11

## 2020-06-11 RX ORDER — CLOPIDOGREL BISULFATE 75 MG/1
75 TABLET ORAL DAILY
Status: DISCONTINUED | OUTPATIENT
Start: 2020-06-12 | End: 2020-06-11 | Stop reason: HOSPADM

## 2020-06-11 RX ORDER — SODIUM CHLORIDE 0.9 % (FLUSH) 0.9 %
10 SYRINGE (ML) INJECTION AS NEEDED
Status: CANCELLED | OUTPATIENT
Start: 2020-06-11

## 2020-06-11 RX ORDER — ASPIRIN 81 MG/1
TABLET, CHEWABLE ORAL AS NEEDED
Status: DISCONTINUED | OUTPATIENT
Start: 2020-06-11 | End: 2020-06-11 | Stop reason: HOSPADM

## 2020-06-11 RX ORDER — SODIUM CHLORIDE 0.9 % (FLUSH) 0.9 %
3 SYRINGE (ML) INJECTION EVERY 12 HOURS SCHEDULED
Status: DISCONTINUED | OUTPATIENT
Start: 2020-06-11 | End: 2020-06-11 | Stop reason: HOSPADM

## 2020-06-11 RX ORDER — SODIUM CHLORIDE AND POTASSIUM CHLORIDE 150; 900 MG/100ML; MG/100ML
100 INJECTION, SOLUTION INTRAVENOUS CONTINUOUS
Status: DISCONTINUED | OUTPATIENT
Start: 2020-06-11 | End: 2020-06-11 | Stop reason: HOSPADM

## 2020-06-11 RX ORDER — ACETAMINOPHEN 325 MG/1
650 TABLET ORAL EVERY 4 HOURS PRN
Status: DISCONTINUED | OUTPATIENT
Start: 2020-06-11 | End: 2020-06-11 | Stop reason: HOSPADM

## 2020-06-11 RX ORDER — MIDAZOLAM HYDROCHLORIDE 1 MG/ML
INJECTION INTRAMUSCULAR; INTRAVENOUS AS NEEDED
Status: DISCONTINUED | OUTPATIENT
Start: 2020-06-11 | End: 2020-06-11 | Stop reason: HOSPADM

## 2020-06-11 RX ORDER — SODIUM CHLORIDE 0.9 % (FLUSH) 0.9 %
10 SYRINGE (ML) INJECTION AS NEEDED
Status: DISCONTINUED | OUTPATIENT
Start: 2020-06-11 | End: 2020-06-11 | Stop reason: HOSPADM

## 2020-06-11 RX ORDER — FENTANYL CITRATE 50 UG/ML
INJECTION, SOLUTION INTRAMUSCULAR; INTRAVENOUS AS NEEDED
Status: DISCONTINUED | OUTPATIENT
Start: 2020-06-11 | End: 2020-06-11 | Stop reason: HOSPADM

## 2020-06-11 RX ORDER — LIDOCAINE HYDROCHLORIDE 20 MG/ML
INJECTION, SOLUTION INFILTRATION; PERINEURAL AS NEEDED
Status: DISCONTINUED | OUTPATIENT
Start: 2020-06-11 | End: 2020-06-11 | Stop reason: HOSPADM

## 2020-06-11 RX ORDER — SODIUM CHLORIDE 9 MG/ML
75 INJECTION, SOLUTION INTRAVENOUS CONTINUOUS
Status: DISCONTINUED | OUTPATIENT
Start: 2020-06-11 | End: 2020-06-11 | Stop reason: HOSPADM

## 2020-06-11 RX ORDER — LISINOPRIL 20 MG/1
10 TABLET ORAL ONCE
Status: COMPLETED | OUTPATIENT
Start: 2020-06-11 | End: 2020-06-11

## 2020-06-11 RX ADMIN — ACETAMINOPHEN 650 MG: 325 TABLET, FILM COATED ORAL at 12:46

## 2020-06-11 RX ADMIN — LISINOPRIL 10 MG: 20 TABLET ORAL at 12:37

## 2020-06-11 RX ADMIN — SODIUM CHLORIDE 75 ML/HR: 9 INJECTION, SOLUTION INTRAVENOUS at 08:16

## 2020-06-11 NOTE — CONSULTS
Met with patient and family discussed benefits of cardiac rehab. Provided phase II information along with the contact information for cardiac rehab here at [facility]. Patient stated he would review the information provided.

## 2020-06-11 NOTE — DISCHARGE INSTRUCTIONS
[facility]  [address] [place] [address]    Coronary Angiogram with Stent (Radial Approach) After Care    Refer to this sheet in the next few weeks. These instructions provide you with information on caring for yourself after your procedure. Your health care provider may also give you more specific instructions. Your treatment has been planned according to current medical practices, but problems sometimes occur. Call your health care provider if you have any problems or questions after your procedure.       Home Care Instructions:  · You may shower the day after the procedure. Remove the bandage (dressing) and gently wash the site with plain soap and water. Gently pat the site dry. You may apply a band aid daily for 2 days if desired.    · Do not apply powder or lotion to the site.  · Do not submerge the affected site in water for 3 to 5 days or until the site is completely healed.   · Do not flex or bend the affected arm for 24 hours.  · Do not lift, push or pull anything over 2 to 3 pounds for one week after your procedure.  · Do not operate machinery or power tools for 24 hours.  · Inspect the site at least twice daily. You may notice some bruising at the site and it may be tender for 1 to 2 weeks.     · Increase your fluid intake for the next 2 days.    · Keep arm elevated for 24 hours.  For the remainder of the day, keep your arm in the “Pledge of Allegiance” position when up and about.    · Limit your activity for the next 48 hours and avoid strenuous activity as directed by your physician.   · Cardiac Rehab may or may not be ordered.  Please consult with your physician  · You may drive 24 hours after the procedure unless otherwise instructed by your caregiver.  · A responsible adult should be with you for the first 24 hours after you arrive home.   · Do not make any important legal decisions or sign legal papers for 24 hours. Do not drink alcohol for 24 hours.    · Take medicines only  as directed by your health care provider.  Blood thinners may be prescribed after your procedure to improve blood flow through the stent.    · Metformin or any medications containing Metformin should not be taken for 48 hours after your procedure.    · Eat a heart-healthy diet. This should include plenty of fresh fruits and vegetables. Meat should be lean cuts. Avoid the following types of food:    ¨ Food that is high in salt.    ¨ Canned or highly processed food.    ¨ Food that is high in saturated fat or sugar.    ¨ Fried food.    · Make any other lifestyle changes recommended by your health care provider. This may include:    ¨ Not using any tobacco products including cigarettes, chewing tobacco, or electronic cigarettes.   ¨ Managing your weight.    ¨ Getting regular exercise.    ¨ Managing your blood pressure.    ¨ Limiting your alcohol intake.    ¨ Managing other health problems, such as diabetes.    · If you need an MRI after your heart stent was placed, be sure to tell the health care provider who orders the MRI that you have a heart stent.    · Keep all follow-up visits as directed by your health care provider.    ·   Call Your Doctor If:  · You have unusual pain at the radial/ulnar (wrist) site.  · You have redness, warmth, swelling, or pain at the radial/ulnar (wrist) site.  · You have drainage (other than a small amount of blood on the dressing).  · `You have chills or a fever > 101.  · Your arm becomes pale or dark, cool, tingly, or numb.  · You develop chest pain, shortness of breath, feel faint or pass out.    · You have heavy bleeding from the site, hold pressure on the site for 20 minutes.  If the bleeding stops, apply a fresh bandage and call your cardiologist.  However, if you continue to have bleeding, call 911.        Make Sure You:   · Understand these instructions.  · Will watch your condition.  · Will get help right away if you are not doing well or get worse.

## 2020-06-12 ENCOUNTER — TELEPHONE (OUTPATIENT)
Dept: CARDIOLOGY | Facility: CLINIC | Age: 65
End: 2020-06-12

## 2020-06-17 LAB
ACT BLD: 241 SECONDS (ref 82–152)
ACT BLD: 346 SECONDS (ref 82–152)
ACT BLD: 400 SECONDS (ref 82–152)

## 2020-06-26 ENCOUNTER — OFFICE VISIT (OUTPATIENT)
Dept: CARDIAC REHAB | Facility: HOSPITAL | Age: 65
End: 2020-06-26

## 2020-06-26 DIAGNOSIS — Z95.5 S/P DRUG ELUTING CORONARY STENT PLACEMENT: Primary | ICD-10-CM

## 2020-06-26 PROCEDURE — 93797 PHYS/QHP OP CAR RHAB WO ECG: CPT

## 2020-06-29 ENCOUNTER — TREATMENT (OUTPATIENT)
Dept: CARDIAC REHAB | Facility: HOSPITAL | Age: 65
End: 2020-06-29

## 2020-06-29 DIAGNOSIS — Z95.5 S/P DRUG ELUTING CORONARY STENT PLACEMENT: Primary | ICD-10-CM

## 2020-06-29 LAB — GLUCOSE BLDC GLUCOMTR-MCNC: 193 MG/DL (ref 70–130)

## 2020-06-29 PROCEDURE — 93798 PHYS/QHP OP CAR RHAB W/ECG: CPT

## 2020-06-29 PROCEDURE — 82962 GLUCOSE BLOOD TEST: CPT

## 2020-07-01 ENCOUNTER — TREATMENT (OUTPATIENT)
Dept: CARDIAC REHAB | Facility: HOSPITAL | Age: 65
End: 2020-07-01

## 2020-07-01 ENCOUNTER — TELEPHONE (OUTPATIENT)
Dept: CARDIOLOGY | Facility: CLINIC | Age: 65
End: 2020-07-01

## 2020-07-01 DIAGNOSIS — Z95.5 S/P DRUG ELUTING CORONARY STENT PLACEMENT: Primary | ICD-10-CM

## 2020-07-01 PROCEDURE — 93798 PHYS/QHP OP CAR RHAB W/ECG: CPT

## 2020-07-01 NOTE — TELEPHONE ENCOUNTER
Patient reports he received stent recently and is still having discoloration of skin at cath site to wrist/ hand.  He is wanting to come in for someone to look at it.  He reports it is worsening and he is concerned.  Recommended going to the ER as it is after hours and he reports it is worsening but he declines.  Discussed risks.  He reports he is going to call the office in the morning to see if he can come in for someone to look at it.  Of note, he is able to feel his pulse in his wrist upon palpation.     Dr. Hitchcock and Dr. Mckeon--- Please advise.

## 2020-07-06 ENCOUNTER — TREATMENT (OUTPATIENT)
Dept: CARDIAC REHAB | Facility: HOSPITAL | Age: 65
End: 2020-07-06

## 2020-07-06 DIAGNOSIS — Z95.5 S/P DRUG ELUTING CORONARY STENT PLACEMENT: Primary | ICD-10-CM

## 2020-07-06 PROCEDURE — 93798 PHYS/QHP OP CAR RHAB W/ECG: CPT

## 2020-07-08 ENCOUNTER — TREATMENT (OUTPATIENT)
Dept: CARDIAC REHAB | Facility: HOSPITAL | Age: 65
End: 2020-07-08

## 2020-07-08 DIAGNOSIS — Z95.5 S/P DRUG ELUTING CORONARY STENT PLACEMENT: Primary | ICD-10-CM

## 2020-07-08 PROCEDURE — 93798 PHYS/QHP OP CAR RHAB W/ECG: CPT

## 2020-07-13 ENCOUNTER — TREATMENT (OUTPATIENT)
Dept: CARDIAC REHAB | Facility: HOSPITAL | Age: 65
End: 2020-07-13

## 2020-07-13 DIAGNOSIS — Z95.5 S/P DRUG ELUTING CORONARY STENT PLACEMENT: Primary | ICD-10-CM

## 2020-07-13 PROCEDURE — 93798 PHYS/QHP OP CAR RHAB W/ECG: CPT

## 2020-07-15 ENCOUNTER — TREATMENT (OUTPATIENT)
Dept: CARDIAC REHAB | Facility: HOSPITAL | Age: 65
End: 2020-07-15

## 2020-07-15 DIAGNOSIS — Z95.5 S/P DRUG ELUTING CORONARY STENT PLACEMENT: Primary | ICD-10-CM

## 2020-07-15 PROCEDURE — 93798 PHYS/QHP OP CAR RHAB W/ECG: CPT

## 2020-07-17 ENCOUNTER — TREATMENT (OUTPATIENT)
Dept: CARDIAC REHAB | Facility: HOSPITAL | Age: 65
End: 2020-07-17

## 2020-07-17 DIAGNOSIS — Z95.5 S/P DRUG ELUTING CORONARY STENT PLACEMENT: Primary | ICD-10-CM

## 2020-07-17 PROCEDURE — 93798 PHYS/QHP OP CAR RHAB W/ECG: CPT

## 2020-07-22 ENCOUNTER — TREATMENT (OUTPATIENT)
Dept: CARDIAC REHAB | Facility: HOSPITAL | Age: 65
End: 2020-07-22

## 2020-07-22 DIAGNOSIS — Z95.5 S/P DRUG ELUTING CORONARY STENT PLACEMENT: Primary | ICD-10-CM

## 2020-07-22 PROCEDURE — 93798 PHYS/QHP OP CAR RHAB W/ECG: CPT

## 2020-07-29 ENCOUNTER — TREATMENT (OUTPATIENT)
Dept: CARDIAC REHAB | Facility: HOSPITAL | Age: 65
End: 2020-07-29

## 2020-07-29 DIAGNOSIS — Z95.5 S/P DRUG ELUTING CORONARY STENT PLACEMENT: Primary | ICD-10-CM

## 2020-07-29 PROCEDURE — 93798 PHYS/QHP OP CAR RHAB W/ECG: CPT

## 2020-07-31 ENCOUNTER — TREATMENT (OUTPATIENT)
Dept: CARDIAC REHAB | Facility: HOSPITAL | Age: 65
End: 2020-07-31

## 2020-07-31 DIAGNOSIS — Z95.5 S/P DRUG ELUTING CORONARY STENT PLACEMENT: Primary | ICD-10-CM

## 2020-07-31 PROCEDURE — 82962 GLUCOSE BLOOD TEST: CPT

## 2020-07-31 PROCEDURE — 93798 PHYS/QHP OP CAR RHAB W/ECG: CPT

## 2020-08-03 LAB — GLUCOSE BLDC GLUCOMTR-MCNC: 144 MG/DL (ref 70–130)

## 2020-08-05 ENCOUNTER — APPOINTMENT (OUTPATIENT)
Dept: CARDIAC REHAB | Facility: HOSPITAL | Age: 65
End: 2020-08-05

## 2020-08-07 ENCOUNTER — APPOINTMENT (OUTPATIENT)
Dept: CARDIAC REHAB | Facility: HOSPITAL | Age: 65
End: 2020-08-07

## 2020-08-10 ENCOUNTER — APPOINTMENT (OUTPATIENT)
Dept: CARDIAC REHAB | Facility: HOSPITAL | Age: 65
End: 2020-08-10

## 2020-08-12 ENCOUNTER — APPOINTMENT (OUTPATIENT)
Dept: CARDIAC REHAB | Facility: HOSPITAL | Age: 65
End: 2020-08-12

## 2020-08-14 ENCOUNTER — APPOINTMENT (OUTPATIENT)
Dept: CARDIAC REHAB | Facility: HOSPITAL | Age: 65
End: 2020-08-14

## 2020-08-17 ENCOUNTER — APPOINTMENT (OUTPATIENT)
Dept: CARDIAC REHAB | Facility: HOSPITAL | Age: 65
End: 2020-08-17

## 2020-08-19 ENCOUNTER — APPOINTMENT (OUTPATIENT)
Dept: CARDIAC REHAB | Facility: HOSPITAL | Age: 65
End: 2020-08-19

## 2020-08-21 ENCOUNTER — APPOINTMENT (OUTPATIENT)
Dept: CARDIAC REHAB | Facility: HOSPITAL | Age: 65
End: 2020-08-21

## 2020-08-24 ENCOUNTER — APPOINTMENT (OUTPATIENT)
Dept: CARDIAC REHAB | Facility: HOSPITAL | Age: 65
End: 2020-08-24

## 2020-08-26 ENCOUNTER — APPOINTMENT (OUTPATIENT)
Dept: CARDIAC REHAB | Facility: HOSPITAL | Age: 65
End: 2020-08-26

## 2020-08-28 ENCOUNTER — APPOINTMENT (OUTPATIENT)
Dept: CARDIAC REHAB | Facility: HOSPITAL | Age: 65
End: 2020-08-28

## 2020-08-31 ENCOUNTER — APPOINTMENT (OUTPATIENT)
Dept: CARDIAC REHAB | Facility: HOSPITAL | Age: 65
End: 2020-08-31

## 2020-09-02 ENCOUNTER — APPOINTMENT (OUTPATIENT)
Dept: CARDIAC REHAB | Facility: HOSPITAL | Age: 65
End: 2020-09-02

## 2020-09-04 ENCOUNTER — APPOINTMENT (OUTPATIENT)
Dept: CARDIAC REHAB | Facility: HOSPITAL | Age: 65
End: 2020-09-04

## 2020-09-09 ENCOUNTER — APPOINTMENT (OUTPATIENT)
Dept: CARDIAC REHAB | Facility: HOSPITAL | Age: 65
End: 2020-09-09

## 2020-09-11 ENCOUNTER — APPOINTMENT (OUTPATIENT)
Dept: CARDIAC REHAB | Facility: HOSPITAL | Age: 65
End: 2020-09-11

## 2020-09-14 ENCOUNTER — APPOINTMENT (OUTPATIENT)
Dept: CARDIAC REHAB | Facility: HOSPITAL | Age: 65
End: 2020-09-14

## 2020-09-16 ENCOUNTER — APPOINTMENT (OUTPATIENT)
Dept: CARDIAC REHAB | Facility: HOSPITAL | Age: 65
End: 2020-09-16

## 2020-09-18 ENCOUNTER — APPOINTMENT (OUTPATIENT)
Dept: CARDIAC REHAB | Facility: HOSPITAL | Age: 65
End: 2020-09-18

## 2021-03-22 ENCOUNTER — BULK ORDERING (OUTPATIENT)
Dept: CASE MANAGEMENT | Facility: OTHER | Age: 66
End: 2021-03-22

## 2021-03-22 DIAGNOSIS — Z23 IMMUNIZATION DUE: ICD-10-CM

## 2021-06-02 ENCOUNTER — TELEPHONE (OUTPATIENT)
Dept: CARDIOLOGY | Facility: CLINIC | Age: 66
End: 2021-06-02

## 2021-06-02 ENCOUNTER — OFFICE VISIT (OUTPATIENT)
Dept: CARDIOLOGY | Facility: CLINIC | Age: 66
End: 2021-06-02

## 2021-06-02 VITALS
BODY MASS INDEX: 26.47 KG/M2 | HEIGHT: 72 IN | SYSTOLIC BLOOD PRESSURE: 120 MMHG | HEART RATE: 54 BPM | WEIGHT: 195.4 LBS | DIASTOLIC BLOOD PRESSURE: 80 MMHG

## 2021-06-02 DIAGNOSIS — E11.59 TYPE 2 DIABETES MELLITUS WITH OTHER CIRCULATORY COMPLICATION, WITHOUT LONG-TERM CURRENT USE OF INSULIN (HCC): ICD-10-CM

## 2021-06-02 DIAGNOSIS — I10 ESSENTIAL HYPERTENSION: ICD-10-CM

## 2021-06-02 DIAGNOSIS — I25.110 CORONARY ARTERY DISEASE INVOLVING NATIVE CORONARY ARTERY OF NATIVE HEART WITH UNSTABLE ANGINA PECTORIS (HCC): Primary | ICD-10-CM

## 2021-06-02 DIAGNOSIS — E78.2 MIXED HYPERLIPIDEMIA: ICD-10-CM

## 2021-06-02 PROCEDURE — 99214 OFFICE O/P EST MOD 30 MIN: CPT | Performed by: NURSE PRACTITIONER

## 2021-06-02 PROCEDURE — 93000 ELECTROCARDIOGRAM COMPLETE: CPT | Performed by: NURSE PRACTITIONER

## 2021-06-02 NOTE — TELEPHONE ENCOUNTER
Please call PCP to see if any up to date labs including lipid panel. Patient is adamant he had some since 06/2020 but I can not verify that in care everywhere.

## 2021-06-02 NOTE — PROGRESS NOTES
Date of Office Visit: 21  Encounter Provider: DENNISE Josue  Place of Service: [facility] CARDIOLOGY  Patient Name: Shira Vargas  :1955    Chief Complaint   Patient presents with   • Coronary artery disease involving native coronary artery of    • Follow-up   :     HPI: Shira Vargas is a 66 y.o. male  with history of coronary artery disease status post angioplasty and stent placement, myocardial infarction, hypertension, and hyperlipidemia.  He was previously followed by Dr. Mckeon.  I will visit with him in follow-up today and have reviewed his medical record.  In 2012 he presented with acute onset of chest pain and was found to have an acute anterior myocardial infarction.  He was taken emergently to the Cath Lab was found to have decreased left ventricular systolic function with an ejection fraction of 45%.  He had total occlusion of the mid LAD.  First diagonal had insignificant disease as did the circumflex.  The right coronary artery had insignificant disease.  He had borderline disease of the posterior lateral vessel and had angioplasty bare-metal stent to the mid LAD.  He later suffered a fall in  and suffered herniated compression of L4-L5 with compression on the L5 nerve root.  He was receiving back injections.    He presents today for annual reassessment.  He is walking every other day up to 2 miles on his elliptical.  He has no exertional symptoms with that.  He has some issues with numb left toes if he does not exercise for couple days but that improves providing he stays active.  He has not dizziness, lightheadedness or near syncope.  Overall, his been doing well          No Known Allergies        Family and social history reviewed.     ROS  All other systems were reviewed and are negative          Objective:     Vitals:    21 1021   BP: 120/80   BP Location: Right arm   Patient Position: Sitting   Pulse: 54   Weight: 88.6 kg (195 lb  Message left on pts personally identified vm notifying of MD message "6.4 oz)   Height: 182.9 cm (72\")     Body mass index is 26.5 kg/m².    PHYSICAL EXAM:  Constitutional:       General: Not in acute distress.     Appearance: Well-developed. Not diaphoretic.   HENT:      Head: Normocephalic.   Pulmonary:      Effort: Pulmonary effort is normal. No respiratory distress.      Breath sounds: Normal breath sounds. No wheezing. No rhonchi. No rales.   Cardiovascular:      Normal rate. Regular rhythm.   Pulses:     Radial: 2+ bilaterally.  Skin:     General: Skin is warm and dry. There is no cyanosis.      Findings: No rash.   Neurological:      Mental Status: Alert and oriented to person, place, and time.   Psychiatric:         Behavior: Behavior normal.         Thought Content: Thought content normal.         Judgment: Judgment normal.           ECG 12 Lead    Date/Time: 6/2/2021 10:39 AM  Performed by: Nyla Bruner APRN  Authorized by: Nyla Bruner APRN   Comparison: compared with previous ECG   Similar to previous ECG  Rhythm: sinus rhythm  Rate: normal  Comments: No ischemic changes               Current Outpatient Medications   Medication Sig Dispense Refill   • aspirin 81 MG tablet Take  by mouth Daily.     • clopidogrel (PLAVIX) 75 MG tablet Take 1 tablet by mouth Daily. 90 tablet 3   • lisinopril (PRINIVIL,ZESTRIL) 10 MG tablet Take  by mouth.     • metFORMIN (GLUCOPHAGE) 500 MG tablet Take  by mouth 2 (Two) Times a Day.     • metoprolol tartrate (LOPRESSOR) 25 MG tablet 25 mg. 1/2 tablet bid     • sildenafil (VIAGRA) 100 MG tablet Take 100 mg by mouth As Needed.     • simvastatin (ZOCOR) 40 MG tablet Take 40 mg by mouth Every Night.     • Omega-3 Fatty Acids (OMEGA 3 PO) Take  by mouth.       No current facility-administered medications for this visit.     Assessment:       Diagnosis Plan   1. Coronary artery disease involving native coronary artery of native heart with unstable angina pectoris (CMS/HCC)     2. Mixed hyperlipidemia     3. Essential hypertension     4. Type 2 " diabetes mellitus with other circulatory complication, without long-term current use of insulin (CMS/[facility])          Orders Placed This Encounter   Procedures   • ECG 12 Lead     This order was created via procedure documentation     Order Specific Question:   Release to patient     Answer:   Immediate         Plan:     1.    66-year-old gentleman with coronary artery disease with history of anterior myocardial infarction in 10/2012 with angioplasty bare-metal stent to the LAD, mild left ventricular systolic dysfunction with an EF of 45%.  Follow-up echo August 2013 LVEF returned to normal.  Negative perfusion stress test December 2016.  No angina no ECG changes and he is staying active without exertional symptoms.  We will continue the same    2.Hypertension blood pressure appears well controlled today continue  3.Hyperlipidemia LDL was 85 just 1 year ago.  Target is 70 or less for LDL.  He is only on simvastatin 40 mg.  Would benefit to switch to atorvastatin or rosuvastatin preferably.  He states he has had lipid panel checked since then but I do not see that in Care Everywhere.  We will call his PCP to see about that  4.   Diabetes mellitus-most recent hemoglobin A1c October 2000 27.4  5. 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          It has been a pleasure to participate in this patient's care.      Thank you,  DENNISE Josue      **I used Dragon to dictate this note:**

## 2021-06-02 NOTE — TELEPHONE ENCOUNTER
I spoke with Rosi in Dr. Solis's office.  The last lipid panel done was 6/2020,  Patient has an appt tomorrow with Dr. Solis to refill his medications. Rosi is going to check with Dr. Solis to see if he wants to order an annual lipid panel and get back with us. / MARISABEL

## 2021-06-07 NOTE — TELEPHONE ENCOUNTER
Received lab results faxed to us from Dr. Solis.  Placing in your inbox for review./ MARISABEL     Patient can be reached at [phone]

## 2021-06-07 NOTE — TELEPHONE ENCOUNTER
Called and spoke with Dr. Solis's office.  A Lipid panel was done a few days ago.  I asked that they please fax the results to us for Nyla to review. / MARISABEL

## 2021-06-07 NOTE — TELEPHONE ENCOUNTER
Nyla Bruner not in the office this week.  Lipid panel 6/3/2021 showed total cholesterol of 141, triglycerides of 176, HDL of 35, LDL of 71, VLDL of 35.  TSH was normal.  PSA was normal.  Hemoglobin was normal.  AST/ALT were normal.  Creatinine 0.5 and EGFR greater than 60.  A1c was above goal at 7.6%.  Will send labs to scanning.    Cathie--  Please let patient know that cholesterol with some improvement in his bad cholesterol (LDL) though triglycerides still too high.  Really need to ensure diabetes better controlled and continue to avoid sugars and white carbohydrates and alcohol.  Continue to follow with PCP to ensure that this improves.  It looks like patient said that PCP had refilled his medications, is this correct?      Nyla--- we will defer any medication changes to you when you return to the office.

## 2021-06-07 NOTE — TELEPHONE ENCOUNTER
Patient notified to avoid sugars, white carbohydrates and alcohol.  Per patient, his PCP monitors his cholesterol and will take care of prescribing medication. / MARISABEL

## 2022-06-08 ENCOUNTER — OFFICE VISIT (OUTPATIENT)
Dept: CARDIOLOGY | Facility: CLINIC | Age: 67
End: 2022-06-08

## 2022-06-08 VITALS
HEIGHT: 72 IN | DIASTOLIC BLOOD PRESSURE: 80 MMHG | SYSTOLIC BLOOD PRESSURE: 128 MMHG | BODY MASS INDEX: 25.06 KG/M2 | WEIGHT: 185 LBS | HEART RATE: 58 BPM

## 2022-06-08 DIAGNOSIS — E11.59 TYPE 2 DIABETES MELLITUS WITH OTHER CIRCULATORY COMPLICATION, WITHOUT LONG-TERM CURRENT USE OF INSULIN: ICD-10-CM

## 2022-06-08 DIAGNOSIS — E78.2 MIXED HYPERLIPIDEMIA: ICD-10-CM

## 2022-06-08 DIAGNOSIS — I25.110 CORONARY ARTERY DISEASE INVOLVING NATIVE CORONARY ARTERY OF NATIVE HEART WITH UNSTABLE ANGINA PECTORIS: Primary | ICD-10-CM

## 2022-06-08 PROCEDURE — 99214 OFFICE O/P EST MOD 30 MIN: CPT | Performed by: NURSE PRACTITIONER

## 2022-06-08 PROCEDURE — 93000 ELECTROCARDIOGRAM COMPLETE: CPT | Performed by: NURSE PRACTITIONER

## 2022-06-08 NOTE — PROGRESS NOTES
Date of Office Visit: 22  Encounter Provider: DENNISE Josue  Place of Service: [facility] CARDIOLOGY  Patient Name: Shira Vargas  :1955    Chief Complaint   Patient presents with   • Coronary artery disease involving native coronary artery of   • Follow-up   :     HPI: Shira Vargas is a 67 y.o. male  with coronary artery disease status post angioplasty and stent placement, myocardial infarction, hypertension, and hyperlipidemia.  He was previously followed by Dr. Mckeon.  I will visit with him in follow-up today and have reviewed his medical record.  In 2012 he presented with acute onset of chest pain and was found to have an acute anterior myocardial infarction.  He was taken emergently to the Cath Lab was found to have decreased left ventricular systolic function with an ejection fraction of 45%.  He had total occlusion of the mid LAD.  First diagonal had insignificant disease as did the circumflex.  The right coronary artery had insignificant disease.  He had borderline disease of the posterior lateral vessel and had angioplasty bare-metal stent to the mid LAD.  He later suffered a fall in  and suffered herniated compression of L4-L5 with compression on the L5 nerve root.  He was receiving back injections. In 2020 he had cardiac cath which showed Multivessel coronary artery disease, involving the proximal, mid, distal RCA, DOUG, OM 2.  Successful intervention of the mid, distal RCA and balloon angioplasty of the DOUG.  Mild in-stent restenosis of the mid LAD stent. plavix was added to his regimen.      He presents today for reassessment.  He walks 4 to 5 days a week for half hour and has no exertional symptoms.  He continues to take aspirin daily and denies dizziness, near-syncope, edema, chest pain or shortness of breath.          No Known Allergies        Family and social history reviewed.     ROS  All other systems were reviewed and are  "negative          Objective:     Vitals:    06/08/22 1456   BP: 128/80   BP Location: Left arm   Patient Position: Sitting   Pulse: 58   Weight: 83.9 kg (185 lb)   Height: 182.9 cm (72\")     Body mass index is 25.09 kg/m².    PHYSICAL EXAM:  Pulmonary:      Effort: Pulmonary effort is normal.   Cardiovascular:      Normal rate. Regular rhythm.           ECG 12 Lead    Date/Time: 6/8/2022 4:58 PM  Performed by: Nyla Bruner APRN  Authorized by: Nyla Bruner APRN   Comparison: compared with previous ECG   Similar to previous ECG  Rhythm: sinus rhythm  Rate: normal  Other findings: non-specific ST-T wave changes  Comments: No change              Current Outpatient Medications   Medication Sig Dispense Refill   • aspirin 81 MG tablet Take  by mouth Daily.     • Cholecalciferol (VITAMIN D3 PO) Take  by mouth Daily.     • Cyanocobalamin (VITAMIN B12 PO) Take  by mouth Daily.     • lisinopril (PRINIVIL,ZESTRIL) 10 MG tablet Take  by mouth.     • metFORMIN (GLUCOPHAGE) 500 MG tablet Take  by mouth 2 (Two) Times a Day.     • metoprolol tartrate (LOPRESSOR) 25 MG tablet 12.5 mg 2 (Two) Times a Day. 1/2 tablet bid     • sildenafil (VIAGRA) 100 MG tablet Take 100 mg by mouth As Needed.     • simvastatin (ZOCOR) 40 MG tablet Take 40 mg by mouth Every Night.       No current facility-administered medications for this visit.     Assessment:       Diagnosis Plan   1. Coronary artery disease involving native coronary artery of native heart with unstable angina pectoris (HCC)     2. Mixed hyperlipidemia     3. Type 2 diabetes mellitus with other circulatory complication, without long-term current use of insulin (HCC)          No orders of the defined types were placed in this encounter.        Plan:         1.    67-year-old gentleman with coronary artery disease with history of anterior myocardial infarction in 10/2012 with angioplasty bare-metal stent to the LAD. Repeat cath 06/2020 Multivessel coronary artery disease, " involving the proximal, mid, distal RCA, DOUG, OM 2.  Successful intervention of the mid, distal RCA and balloon angioplasty of the DOUG.  Mild in-stent restenosis of the mid LAD stent  - no angina, exercising routinely without symptoms., no EKG changes. continue ASA and statin therapy.     2.Hypertension blood pressure appears well controlled today continue  3.Hyperlipidemia LDL goal 70 or less. Last LDL 78  4.   Diabetes mellitus-most recent hemoglobin A1c  6.6 in 02/2022  5. 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 Sargent.           It has been a pleasure to participate in this patient's care.      Thank you,  DENNISE Josue      **I used Dragon to dictate this note:**

## (undated) DEVICE — TREK CORONARY DILATATION CATHETER 3.50 MM X 25 MM / RAPID-EXCHANGE: Brand: TREK

## (undated) DEVICE — CATH DIAG IMPULSE FL3.5 5F 100CM

## (undated) DEVICE — NC TREK CORONARY DILATATION CATHETER 3.5 MM X 25 MM / RAPID-EXCHANGE: Brand: NC TREK

## (undated) DEVICE — TREK CORONARY DILATATION CATHETER 3.0 MM X 25 MM / RAPID-EXCHANGE: Brand: TREK

## (undated) DEVICE — GW EMR FIX EXCHG J STD .035 3MM 260CM

## (undated) DEVICE — TR BAND RADIAL ARTERY COMPRESSION DEVICE: Brand: TR BAND

## (undated) DEVICE — NC TREK CORONARY DILATATION CATHETER 3.0 MM X 15 MM / RAPID-EXCHANGE: Brand: NC TREK

## (undated) DEVICE — CATH DIAG IMPULSE FR4 5F 100CM

## (undated) DEVICE — DEV INDEFLATOR

## (undated) DEVICE — CATH VENT MIV RADL PIG ST TIP 5F 110CM

## (undated) DEVICE — TREK CORONARY DILATATION CATHETER 3.0 MM X 15 MM / RAPID-EXCHANGE: Brand: TREK

## (undated) DEVICE — TREK CORONARY DILATATION CATHETER 2.50 MM X 12 MM / RAPID-EXCHANGE: Brand: TREK

## (undated) DEVICE — RUNTHROUGH NS EXTRA FLOPPY PTCA GUIDEWIRE: Brand: RUNTHROUGH

## (undated) DEVICE — GLIDESHEATH SLENDER STAINLESS STEEL KIT: Brand: GLIDESHEATH SLENDER

## (undated) DEVICE — KT MANIFLD CARDIAC

## (undated) DEVICE — PK CATH CARD 40

## (undated) DEVICE — 6F .070 JR 4 100CM: Brand: CORDIS